# Patient Record
Sex: FEMALE | Race: WHITE
[De-identification: names, ages, dates, MRNs, and addresses within clinical notes are randomized per-mention and may not be internally consistent; named-entity substitution may affect disease eponyms.]

---

## 2019-03-02 ENCOUNTER — HOSPITAL ENCOUNTER (EMERGENCY)
Dept: HOSPITAL 31 - C.ER | Age: 54
LOS: 1 days | Discharge: HOME | End: 2019-03-03
Payer: MEDICAID

## 2019-03-02 VITALS — BODY MASS INDEX: 28.1 KG/M2

## 2019-03-02 DIAGNOSIS — K56.41: Primary | ICD-10-CM

## 2019-03-02 LAB
ALBUMIN SERPL-MCNC: 4.9 G/DL (ref 3.5–5)
ALBUMIN/GLOB SERPL: 2 {RATIO} (ref 1–2.1)
ALT SERPL-CCNC: 20 U/L (ref 9–52)
AST SERPL-CCNC: 19 U/L (ref 14–36)
BASOPHILS # BLD AUTO: 0.2 K/UL (ref 0–0.2)
BASOPHILS NFR BLD: 2.1 % (ref 0–2)
BUN SERPL-MCNC: 14 MG/DL (ref 7–17)
CALCIUM SERPL-MCNC: 9.1 MG/DL (ref 8.6–10.4)
EOSINOPHIL # BLD AUTO: 0.1 K/UL (ref 0–0.7)
EOSINOPHIL NFR BLD: 1.1 % (ref 0–4)
ERYTHROCYTE [DISTWIDTH] IN BLOOD BY AUTOMATED COUNT: 12.7 % (ref 11.5–14.5)
GFR NON-AFRICAN AMERICAN: > 60
HGB BLD-MCNC: 13.8 G/DL (ref 11–16)
LIPASE: 57 U/L (ref 23–300)
LYMPHOCYTES # BLD AUTO: 1.1 K/UL (ref 1–4.3)
LYMPHOCYTES NFR BLD AUTO: 12.7 % (ref 20–40)
MCH RBC QN AUTO: 28.7 PG (ref 27–31)
MCHC RBC AUTO-ENTMCNC: 33.4 G/DL (ref 33–37)
MCV RBC AUTO: 85.9 FL (ref 81–99)
MONOCYTES # BLD: 0.4 K/UL (ref 0–0.8)
MONOCYTES NFR BLD: 4.9 % (ref 0–10)
NEUTROPHILS # BLD: 6.7 K/UL (ref 1.8–7)
NEUTROPHILS NFR BLD AUTO: 79.2 % (ref 50–75)
NRBC BLD AUTO-RTO: 0.1 % (ref 0–2)
PLATELET # BLD: 240 K/UL (ref 130–400)
PMV BLD AUTO: 8.1 FL (ref 7.2–11.7)
RBC # BLD AUTO: 4.81 MIL/UL (ref 3.8–5.2)
WBC # BLD AUTO: 8.4 K/UL (ref 4.8–10.8)

## 2019-03-02 PROCEDURE — 84703 CHORIONIC GONADOTROPIN ASSAY: CPT

## 2019-03-02 PROCEDURE — 83690 ASSAY OF LIPASE: CPT

## 2019-03-02 PROCEDURE — 99284 EMERGENCY DEPT VISIT MOD MDM: CPT

## 2019-03-02 PROCEDURE — 85025 COMPLETE CBC W/AUTO DIFF WBC: CPT

## 2019-03-02 PROCEDURE — 74022 RADEX COMPL AQT ABD SERIES: CPT

## 2019-03-02 PROCEDURE — 96374 THER/PROPH/DIAG INJ IV PUSH: CPT

## 2019-03-02 PROCEDURE — 80053 COMPREHEN METABOLIC PANEL: CPT

## 2019-03-02 PROCEDURE — 96361 HYDRATE IV INFUSION ADD-ON: CPT

## 2019-03-02 NOTE — C.PDOC
History Of Present Illness


53 year old female presents to the ED c/o colichy abdominal pain and no bowel 

movements for the past 2 days. Patient states she had a failed attempt with 

glycerin suppository and fleet enema yesterday. Patient reports she suffers from

chronic constipation. Patient denies fever, chills, nausea, vomit, diarrhea, 

dysuria, hematuria, rash. 


Time Seen by Provider: 03/02/19 21:50


Chief Complaint (Nursing): GI Problem


History Per: Patient


History/Exam Limitations: no limitations


Onset/Duration Of Symptoms: Days


Current Symptoms Are (Timing): Still Present


Location Of Pain/Discomfort: Diffuse


Quality Of Discomfort: "Pain"


Associated Symptoms: Constipation.  denies: Nausea, Vomiting, Diarrhea, Urinary 

Symptoms


Recent travel outside of the [place]: No


Additional History Per: Patient


Abnormal Vaginal Bleeding: No





Past Medical History


Reviewed: Historical Data, Nursing Documentation, Vital Signs


Vital Signs: 





                                Last Vital Signs











Temp  98.9 F   03/02/19 21:34


 


Pulse  92 H  03/02/19 21:34


 


Resp  20   03/02/19 21:34


 


BP  141/81   03/02/19 21:34


 


Pulse Ox  99   03/02/19 21:34














- Medical History


PMH: HTN, Hypercholesterolemia


Surgical History: No Surg Hx


Family History: States: Unknown Family Hx





- Social History


Hx Alcohol Use: No


Hx Substance Use: No





- Immunization History


Hx Tetanus Toxoid Vaccination: No


Hx Influenza Vaccination: No


Hx Pneumococcal Vaccination: No





Review Of Systems


Constitutional: Negative for: Fever, Chills


Cardiovascular: Negative for: Chest Pain


Respiratory: Negative for: Cough, Shortness of Breath


Gastrointestinal: Positive for: Abdominal Pain, Constipation.  Negative for: 

Nausea, Vomiting, Diarrhea


Genitourinary: Negative for: Dysuria


Skin: Negative for: Rash


Neurological: Negative for: Weakness, Numbness





Physical Exam





- Physical Exam


Appears: Non-toxic, No Acute Distress, Other (older than stated age, obese)


Skin: Normal Color, Warm, Dry


Head: Atraumatic, Normacephalic


Eye(s): bilateral: Normal Inspection


Oral Mucosa: Moist


Neck: Normal ROM, Supple


Chest: Symmetrical


Cardiovascular: Rhythm Regular


Respiratory: Normal Breath Sounds, No Rales, No Rhonchi, No Wheezing


Gastrointestinal/Abdominal: Soft, No Tenderness, Distention, No Guarding, No 

Rebound


Back: No CVA Tenderness


Extremity: Normal ROM, No Tenderness, No Swelling


Neurological/Psych: Oriented x3, Normal Speech, Normal Cognition


Gait: Steady





ED Course And Treatment





- Laboratory Results


Result Diagrams: 


                                 03/02/19 22:07





                                 03/02/19 22:07


Lab Results: 





                                        











Total Bilirubin  1.1 mg/dL (0.2-1.3)   03/02/19  22:07    


 


AST  19 U/L (14-36)   03/02/19  22:07    


 


ALT  20 U/L (9-52)   03/02/19  22:07    


 


Alkaline Phosphatase  75 U/L ()   03/02/19  22:07    


 


Total Protein  7.3 g/dL (6.3-8.3)   03/02/19  22:07    


 


Albumin  4.9 g/dL (3.5-5.0)   03/02/19  22:07    


 


Globulin  2.4 gm/dL (2.2-3.9)   03/02/19  22:07    


 


Albumin/Globulin Ratio  2.0  (1.0-2.1)   03/02/19  22:07    








                                        











Lipase  57 U/L ()   03/02/19  22:07    








                                        











Urine HCG, Qual  Negative  (NEGATIVE)   03/02/19  22:03    








                                        











Urine HCG, Qual  Negative  (NEGATIVE)   03/02/19  22:03    











Lab Interpretation: Normal (ua neg.)


Urine Pregnancy POC: Negative


O2 Sat by Pulse Oximetry: 99 (ON RA)


Pulse Ox Interpretation: Normal





- Radiology


CXR: Interpreted by Me


CXR Interpretation: Yes: No Acute Disease





- Other Rad


  ** abd x 2


X-Ray: Interpreted by Me (+FOS, + rectal stool impaction)


Progress Note: lubricate w Urojet, digital disempaction, Fleets enema to dwell 

in extreme L lateral decubitus Trendelenberg x 20 minutes, then large satisfying

BM.  pt feels much better.  PO Mag Citrate given


Reevaluation Time: 23:40


Reassessment Condition: Improved





Medical Decision Making


Medical Decision Making: 





acute on chronic constipation


very constipating diet.


now s/p disempaction





tight anus may contribute to rectal impaction


? underlying anal lesion (not detected in this exam)


no s/s of weight loss.


Refer for colonoscopy





Disposition


Doctor Will See Patient In The: Office


Counseled Patient/Family Regarding: Studies Performed, Diagnosis





- Disposition


Referrals: 


[facility] [Outside]


[facility] [Outside]


[facility] [Outside]


Kay Beckman MD [Staff Provider] - 


Disposition: HOME/ ROUTINE


Disposition Time: 23:42


Condition: GOOD


Additional Instructions: 


drink the laxative now and re-evaluate your abdominal discomfort after 2-3 bowel

movements


diet and exercise changes


7 fresh fruits and vegetables daily


45 minute power walk 5 days/week





Have Dr. Beckman refer you for Colonoscopy- anus tight may be anxiety vs 

pathological








Instructions:  Constipation in Adults, Fecal Impaction


Forms:  [employer] (English)





- Clinical Impression


Clinical Impression: 


 Constipation, Fecal impaction in rectum








- Scribe Statement


The provider has reviewed the documentation as recorded by the Scribe


Markell Sanchez





All medical record entries made by the Scribe were at my direction and 

personally dictated by me. I have reviewed the chart and agree that the record 

accurately reflects my personal performance of the history, physical exam, 

medical decision making, and the department course for this patient. I have also

personally directed, reviewed, and agree with the discharge instructions and 

disposition.

## 2019-03-03 VITALS
DIASTOLIC BLOOD PRESSURE: 84 MMHG | SYSTOLIC BLOOD PRESSURE: 134 MMHG | TEMPERATURE: 98.3 F | RESPIRATION RATE: 16 BRPM | HEART RATE: 97 BPM

## 2019-03-03 VITALS — OXYGEN SATURATION: 99 %

## 2019-03-03 NOTE — RAD
Date of service: 



03/02/2019



PROCEDURE:  Radiographs of the chest and abdomen (obstructive series)



HISTORY:

Abdominal pain



COMPARISON:

Comparison made with chest radiograph 02/04/2017.



TECHNIQUE:

AP radiograph of the chest, with upright and supine radiographs of 

the abdomen.



FINDINGS:



CHEST:

Lungs: Mild bibasilar atelectasis. 



Cardiovascular: Normal size heart. No pulmonary vascular congestion. 



 No aortic atherosclerotic calcification present



Pleura: No pleural fluid. No pneumothorax.



Other findings: None.



ABDOMEN AND PELVIS:

Bowel: No evidence acute mechanical bowel obstruction however there 

is a large amount of stool seen throughout the colon consistent with 

fecal retention/constipation and probably mild fecal impaction...



Free air: None.



Bones:  Unremarkable.



Other findings: None.



IMPRESSION:

Minor bibasilar atelectasis.  Findings consistent with significant 

constipation and probably mild fecal impaction